# Patient Record
Sex: MALE | Race: WHITE | NOT HISPANIC OR LATINO | Employment: UNEMPLOYED | URBAN - METROPOLITAN AREA
[De-identification: names, ages, dates, MRNs, and addresses within clinical notes are randomized per-mention and may not be internally consistent; named-entity substitution may affect disease eponyms.]

---

## 2023-09-08 ENCOUNTER — HOSPITAL ENCOUNTER (EMERGENCY)
Facility: MEDICAL CENTER | Age: 43
End: 2023-09-08
Attending: EMERGENCY MEDICINE

## 2023-09-08 VITALS
HEART RATE: 65 BPM | SYSTOLIC BLOOD PRESSURE: 118 MMHG | RESPIRATION RATE: 16 BRPM | TEMPERATURE: 97.8 F | OXYGEN SATURATION: 98 % | DIASTOLIC BLOOD PRESSURE: 76 MMHG

## 2023-09-08 DIAGNOSIS — F15.10 AMPHETAMINE ABUSE (HCC): ICD-10-CM

## 2023-09-08 LAB — GLUCOSE BLD STRIP.AUTO-MCNC: 106 MG/DL (ref 65–99)

## 2023-09-08 PROCEDURE — 96372 THER/PROPH/DIAG INJ SC/IM: CPT

## 2023-09-08 PROCEDURE — 82962 GLUCOSE BLOOD TEST: CPT

## 2023-09-08 PROCEDURE — 700111 HCHG RX REV CODE 636 W/ 250 OVERRIDE (IP): Performed by: EMERGENCY MEDICINE

## 2023-09-08 PROCEDURE — 99284 EMERGENCY DEPT VISIT MOD MDM: CPT

## 2023-09-08 RX ORDER — LORAZEPAM 2 MG/ML
2 INJECTION INTRAMUSCULAR ONCE
Status: COMPLETED | OUTPATIENT
Start: 2023-09-08 | End: 2023-09-08

## 2023-09-08 RX ADMIN — LORAZEPAM 2 MG: 2 INJECTION INTRAMUSCULAR; INTRAVENOUS at 02:02

## 2023-09-08 NOTE — ED NOTES
"Rounded on pt, now awakes to verbal stimuli and nodding head \"yes\" or \"no\" to questions. Pt denies needs.   "

## 2023-09-08 NOTE — ED NOTES
Rounded on pt. Pt wakes to verbal stimuli. Pt ate meal provided. NAD at this time. Will continue to monitor.

## 2023-09-08 NOTE — ED TRIAGE NOTES
Chief Complaint   Patient presents with    Agitation     Meth use     Pt BIB EMS and PD with above mentioned complaints.Patient very restless and agitated at the moment. [place] [address] [place].

## 2023-09-08 NOTE — ED NOTES
Patient room round done, patient appears to be sleeping on gurney, breathing is even and unlabored on room air.

## 2023-09-08 NOTE — ED NOTES
Patient's home medications have been reviewed by the pharmacy team.   Patient brought in for AMS and agitation, currently in observation status.  No past medical history on file.    Patient's Medications    No medications on file     A:  Medications do not appear to be contributing to current complaints.     P:    No recommendations at this time. Patient's identity not known at this time, med rec unable to be completed given patient's significant agitation. Pharmacy will continue to follow for updates on patient status.    Gavin Carmona, PharmD

## 2023-09-08 NOTE — ED NOTES
Rounded on pt. Pt opens eyes with verbal stimuli and nods yes/no to questions. Asked pt if he would like a warm meal, pt nodded yes. Warm meal provided.

## 2023-09-08 NOTE — ED PROVIDER NOTES
ED Provider Note    CHIEF COMPLAINT  Chief Complaint   Patient presents with    Agitation     Meth use       EXTERNAL RECORDS REVIEWED  Unavailable due to unidentified patient    HPI/ROS  LIMITATION TO HISTORY   Select: Intoxication  OUTSIDE HISTORIAN(S):  Law Enforcement      Alia Balderas is a 89 y.o. adult who presents to the emergency department in law enforcement custody for acute altered mental status and agitation.  Patient was arrested when he was found on the street smoking methamphetamine.  Please were unable to find any identifying documentation on the patient patient had erratic behavior and agitation in route with authorities.  Further history of present illness limited by altered mental status.      Past medical surgical family social history meds and allergies unobtainable due to altered mental status none identified patient             PHYSICAL EXAM  VITAL SIGNS: /73   Pulse 68   Temp 36 °C (96.8 °F) (Temporal)   Resp 14   SpO2 95%      Pulse ox interpretation: I interpret this pulse ox as normal.  Constitutional: Disoriented and agitated, disheveled, unkempt HEENT: Atraumatic normocephalic, pupils are equal round reactive to light extraocular movements are intact. The nares is clear, external ears are normal, mouth shows moist mucous membranes normal dentition for age, poor dentition  Neck: Supple, no JVD no tracheal deviation  Cardiovascular: Tachycardic no murmur rub or gallop 2+ pulses peripherally x4  Thorax & Lungs: No respiratory distress, no wheezes rales or rhonchi, No chest tenderness.   GI: Soft nontender nondistended positive bowel sounds, no peritoneal signs  Skin: Multiple excoriated lesions over upper extremities lower extremities no surrounding erythema warmth or induration  Musculoskeletal: Moving all extremities with full range and 5 of 5 strength no acute  deformity  Neurologic: Cranial nerves III through XII are grossly intact no sensory deficit no cerebellar  dysfunction   Psychiatric: Agitated rouses to noxious stimuli      DIAGNOSTIC STUDIES / PROCEDURES    Results for orders placed or performed during the hospital encounter of 09/08/23   POCT glucose device results   Result Value Ref Range    POC Glucose, Blood 106 (H) 65 - 99 mg/dL      COURSE & MEDICAL DECISION MAKING    ED Observation Status? Yes; I am placing the patient in to an observation status due to a diagnostic uncertainty as well as therapeutic intensity. Patient placed in observation status at 01: 42 AM, 9/8/2023.     Observation plan is as follows: IM Ativan, Accu-Chek, serial examinations    ASSESSMENT, COURSE AND PLAN  Care Narrative: 27-am[age]-ldlf-old male presents in police custody after he was found agitated erratic behavior and smoking what was presumed to be methamphetamine by authorities.  Patient is extremely agitated with erratic movements and incomprehensible sounds on arrival he is tachycardic.  Patient was given 1 dose of IM Ativan and he is calm.  Throughout my time with the patient he has improving level of mental status he will now focus directly and he says 1-2 words however will not answer questions appropriately.  Primary suspicion is still acute methamphetamine intoxication.  However patient will require ongoing tertiary examination/ongoing time for metabolization until he can be cleared from medical standpoint.  I discussed this with my partner Dr. Campbell who will assume care and will follow-up/reexamine and appropriately disposition.'s help with this patient greatly appreciated.            FINAL DIAGNOSIS  1.  Altered mental status  2.  Probable methamphetamine intoxication       Electronically signed by: Vidal Ferris M.D., 9/8/2023

## 2023-09-08 NOTE — ED NOTES
Bedside report given to COCO Mckeon. Patient appears to be sleeping on gurney, breathing is even and unlabored on room air.

## 2023-09-08 NOTE — ED NOTES
"Rounded on pt, pt awakes to verbal stimuli and continues to only nod head \"yes\" or \"no\" to questions. Pt offered water and food which he declined.   "

## 2023-09-08 NOTE — ED NOTES
Assumed care of pt, pt sleeping but awakes to light physical stimuli. Pt not answering orientation questions at this time. Bed in lowest locked position, VSS

## 2023-09-09 NOTE — DISCHARGE SUMMARY
ED Observation Discharge Summary    Patient:Alia Thirty-[age]  Patient : 1898  Patient MRN: [id number]  Patient PCP: No primary care provider on file.    Admit Date: 2023  Discharge Date and Time: 23 9:52 PM  Discharge Diagnosis: Methamphetamine use.  Discharge Attending: Blake Molina M.D.  Discharge Service: ED Observation    ED Course  Alia is a 89 y.o. adult who was evaluated at [facility] was found smoking methamphetamines.  Was coming here altered agitated.  And therefore was sedated.    Patient was checked and during the Multiple trays he continued to start awake he was become more purposeful was able to give his name.  At this point at 9:52 PM patient woke up and would like to go home.    Discharge Exam:  /76   Pulse 65   Temp 36.6 °C (97.8 °F) (Temporal)   Resp 16   SpO2 98% .    Constitutional: Awake and alert. Nontoxic  HENT:  Grossly normal  Eyes: Grossly normal  Neck: Normal range of motion  Cardiovascular: Normal heart rate   Thorax & Lungs: No respiratory distress  Abdomen: Nontender  Skin:  No pathologic rash.   Extremities: Well perfused  Psychiatric: Affect normal    Labs  Results for orders placed or performed during the hospital encounter of 23   POCT glucose device results   Result Value Ref Range    POC Glucose, Blood 106 (H) 65 - 99 mg/dL       Radiology  No orders to display       Medications:   New Prescriptions    No medications on file       My final assessment includes methamphetamine use.  Complication of altered mental status  Upon Reevaluation, the patient's condition has: Improved; and will be discharged.    Patient discharged from ED Observation status at 50 2 PM (Time)   (Date).     Total time spent on this ED Observation discharge encounter is > 30 Minutes    Electronically signed by: Blake Molina M.D., 2023 9:52 PM

## 2023-09-09 NOTE — ED NOTES
Bedside report received from Nora SEPULVEDA. Patient sleeping with equal chest rise and fall. On RA.

## 2023-09-09 NOTE — ED NOTES
Discharge teaching and paperwork provided including information regarding Rx. Questions answered. VSS, assessment stable. Patient belonging with patient. Patient D/C to the care of self and ambulated out of the ED.